# Patient Record
Sex: MALE | Race: WHITE | NOT HISPANIC OR LATINO | URBAN - METROPOLITAN AREA
[De-identification: names, ages, dates, MRNs, and addresses within clinical notes are randomized per-mention and may not be internally consistent; named-entity substitution may affect disease eponyms.]

---

## 2017-10-28 ENCOUNTER — HOSPITAL ENCOUNTER (INPATIENT)
Facility: HOSPITAL | Age: 52
LOS: 2 days | Discharge: HOME | DRG: 603 | End: 2017-10-30
Attending: EMERGENCY MEDICINE | Admitting: EMERGENCY MEDICINE

## 2017-10-28 DIAGNOSIS — L03.113 CELLULITIS OF RIGHT HAND: Primary | ICD-10-CM

## 2017-10-28 PROBLEM — B19.20 HEPATITIS C: Status: ACTIVE | Noted: 2017-10-28

## 2017-10-28 LAB
ALBUMIN SERPL BCP-MCNC: 3.6 G/DL
ALP SERPL-CCNC: 82 U/L
ALT SERPL W/O P-5'-P-CCNC: 52 U/L
ANION GAP SERPL CALC-SCNC: 11 MMOL/L
AST SERPL-CCNC: 55 U/L
BASOPHILS # BLD AUTO: 0.05 K/UL
BASOPHILS NFR BLD: 0.6 %
BILIRUB SERPL-MCNC: 0.8 MG/DL
BUN SERPL-MCNC: 15 MG/DL
CALCIUM SERPL-MCNC: 9.4 MG/DL
CHLORIDE SERPL-SCNC: 99 MMOL/L
CO2 SERPL-SCNC: 26 MMOL/L
CREAT SERPL-MCNC: 0.8 MG/DL
DIFFERENTIAL METHOD: ABNORMAL
EOSINOPHIL # BLD AUTO: 0.1 K/UL
EOSINOPHIL NFR BLD: 1.2 %
ERYTHROCYTE [DISTWIDTH] IN BLOOD BY AUTOMATED COUNT: 12.1 %
EST. GFR  (AFRICAN AMERICAN): >60 ML/MIN/1.73 M^2
EST. GFR  (NON AFRICAN AMERICAN): >60 ML/MIN/1.73 M^2
GLUCOSE SERPL-MCNC: 94 MG/DL
HCT VFR BLD AUTO: 39.5 %
HGB BLD-MCNC: 13.8 G/DL
IMM GRANULOCYTES # BLD AUTO: 0.04 K/UL
IMM GRANULOCYTES NFR BLD AUTO: 0.4 %
LYMPHOCYTES # BLD AUTO: 1.3 K/UL
LYMPHOCYTES NFR BLD: 14.3 %
MCH RBC QN AUTO: 32.1 PG
MCHC RBC AUTO-ENTMCNC: 34.9 G/DL
MCV RBC AUTO: 92 FL
MONOCYTES # BLD AUTO: 0.7 K/UL
MONOCYTES NFR BLD: 8.1 %
NEUTROPHILS # BLD AUTO: 6.8 K/UL
NEUTROPHILS NFR BLD: 75.4 %
NRBC BLD-RTO: 0 /100 WBC
PLATELET # BLD AUTO: 173 K/UL
PMV BLD AUTO: 10.3 FL
POTASSIUM SERPL-SCNC: 3.7 MMOL/L
PROT SERPL-MCNC: 8.6 G/DL
RBC # BLD AUTO: 4.3 M/UL
SODIUM SERPL-SCNC: 136 MMOL/L
WBC # BLD AUTO: 9.02 K/UL

## 2017-10-28 PROCEDURE — 99285 EMERGENCY DEPT VISIT HI MDM: CPT | Mod: 25

## 2017-10-28 PROCEDURE — 99223 1ST HOSP IP/OBS HIGH 75: CPT | Mod: ,,, | Performed by: INTERNAL MEDICINE

## 2017-10-28 PROCEDURE — S4991 NICOTINE PATCH NONLEGEND: HCPCS | Performed by: EMERGENCY MEDICINE

## 2017-10-28 PROCEDURE — 63600175 PHARM REV CODE 636 W HCPCS: Performed by: EMERGENCY MEDICINE

## 2017-10-28 PROCEDURE — 96365 THER/PROPH/DIAG IV INF INIT: CPT

## 2017-10-28 PROCEDURE — 80053 COMPREHEN METABOLIC PANEL: CPT

## 2017-10-28 PROCEDURE — S0077 INJECTION, CLINDAMYCIN PHOSP: HCPCS | Performed by: EMERGENCY MEDICINE

## 2017-10-28 PROCEDURE — S0077 INJECTION, CLINDAMYCIN PHOSP: HCPCS | Performed by: INTERNAL MEDICINE

## 2017-10-28 PROCEDURE — 11000001 HC ACUTE MED/SURG PRIVATE ROOM

## 2017-10-28 PROCEDURE — 25000003 PHARM REV CODE 250: Performed by: INTERNAL MEDICINE

## 2017-10-28 PROCEDURE — 96375 TX/PRO/DX INJ NEW DRUG ADDON: CPT

## 2017-10-28 PROCEDURE — 85025 COMPLETE CBC W/AUTO DIFF WBC: CPT

## 2017-10-28 PROCEDURE — 25000003 PHARM REV CODE 250: Performed by: EMERGENCY MEDICINE

## 2017-10-28 PROCEDURE — 99284 EMERGENCY DEPT VISIT MOD MDM: CPT | Mod: ,,, | Performed by: EMERGENCY MEDICINE

## 2017-10-28 RX ORDER — KETOROLAC TROMETHAMINE 30 MG/ML
15 INJECTION, SOLUTION INTRAMUSCULAR; INTRAVENOUS
Status: COMPLETED | OUTPATIENT
Start: 2017-10-28 | End: 2017-10-28

## 2017-10-28 RX ORDER — IBUPROFEN 200 MG
1 TABLET ORAL DAILY
Status: DISCONTINUED | OUTPATIENT
Start: 2017-10-28 | End: 2017-10-30 | Stop reason: HOSPADM

## 2017-10-28 RX ORDER — OXYCODONE AND ACETAMINOPHEN 5; 325 MG/1; MG/1
1 TABLET ORAL EVERY 4 HOURS PRN
Status: DISCONTINUED | OUTPATIENT
Start: 2017-10-28 | End: 2017-10-30 | Stop reason: HOSPADM

## 2017-10-28 RX ORDER — CLINDAMYCIN PHOSPHATE 600 MG/50ML
600 INJECTION, SOLUTION INTRAVENOUS
Status: COMPLETED | OUTPATIENT
Start: 2017-10-28 | End: 2017-10-28

## 2017-10-28 RX ORDER — CLINDAMYCIN PHOSPHATE 600 MG/50ML
600 INJECTION, SOLUTION INTRAVENOUS
Status: DISCONTINUED | OUTPATIENT
Start: 2017-10-28 | End: 2017-10-30 | Stop reason: HOSPADM

## 2017-10-28 RX ORDER — NICOTINE 7MG/24HR
1 PATCH, TRANSDERMAL 24 HOURS TRANSDERMAL DAILY
Status: DISCONTINUED | OUTPATIENT
Start: 2017-10-28 | End: 2017-10-28

## 2017-10-28 RX ADMIN — OXYCODONE HYDROCHLORIDE AND ACETAMINOPHEN 1 TABLET: 5; 325 TABLET ORAL at 09:10

## 2017-10-28 RX ADMIN — NICOTINE 1 PATCH: 14 PATCH, EXTENDED RELEASE TRANSDERMAL at 03:10

## 2017-10-28 RX ADMIN — CLINDAMYCIN IN 5 PERCENT DEXTROSE 600 MG: 12 INJECTION, SOLUTION INTRAVENOUS at 01:10

## 2017-10-28 RX ADMIN — KETOROLAC TROMETHAMINE 15 MG: 30 INJECTION, SOLUTION INTRAMUSCULAR at 03:10

## 2017-10-28 RX ADMIN — CLINDAMYCIN IN 5 PERCENT DEXTROSE 600 MG: 12 INJECTION, SOLUTION INTRAVENOUS at 09:10

## 2017-10-28 NOTE — ED PROVIDER NOTES
"Encounter Date: 10/28/2017    SCRIBE #1 NOTE: I, Beth Burks, am scribing for, and in the presence of,  Dr. Stewart. I have scribed the entire note.       History     Chief Complaint   Patient presents with    Cellulitis     pt states "I think I got bit by a spider, swelling to right hand.  pt also c/o blisters to toes     Time patient was seen by the provider: 12:35 PM      The patient is a 52 y.o. male with no pertinent medical history that presents to the ED with a complaint of swelling to the right hand and arm. He believes that he was bitten by a spider a few days ago. He tried to tried to treat it at home by attempting to opening it and putting peroxide and "another cream" on it. He then noticed increased swelling and pain in his hand and noticed it starting to move up his arm. The swelling currently extends to his axilla. He took Advil PTA. He also endorses bilateral non-healing blisters on his toes.    He endorses hepatitis C for the last 33 years, and taking medication for a while, but stopping because he could not afford the medication. 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 He denies other substance use, numbness in his fingers, medical problems, DM, and home medications. He has not seen a doctor in the last year.        The history is provided by the patient.     Review of patient's allergies indicates:  No Known Allergies  History reviewed. No pertinent past medical history.  Past Surgical History:   Procedure Laterality Date    INGUINAL HERNIA REPAIR      TONSILLECTOMY       History reviewed. No pertinent family history.  Social History   Substance Use Topics    Smoking status: Current Some Day Smoker    Smokeless tobacco: Never Used    Alcohol use 2.4 oz/week     4 Cans of beer per week     Review of Systems   Constitutional: Negative for fever.   HENT: Negative for sore throat.  "   Eyes: Negative for visual disturbance.   Respiratory: Negative for shortness of breath.    Cardiovascular: Negative for chest pain.   Gastrointestinal: Negative for nausea.   Genitourinary: Negative for dysuria.   Musculoskeletal: Negative for back pain.        Positive for swelling in his right hand and arm, extending to his axilla.  Negative for numbness in his fingers.   Skin: Positive for wound (index finger of his right hand, and non-healing blisters on his toes bilaterally). Negative for rash.   Neurological: Negative for weakness.       Physical Exam     Initial Vitals [10/28/17 1206]   BP Pulse Resp Temp SpO2   128/67 66 20 97.9 °F (36.6 °C) 99 %      MAP       87.33         Physical Exam    Nursing note and vitals reviewed.  Constitutional: He appears well-developed and well-nourished. No distress.   HENT:   Head: Normocephalic and atraumatic.   Mouth/Throat: Oropharynx is clear and moist.   Eyes: Pupils are equal, round, and reactive to light.   Neck: Normal range of motion. Neck supple. No thyromegaly present. No muscular tenderness present. No erythema present. No JVD present.   Cardiovascular: Normal rate and regular rhythm. Exam reveals no gallop and no friction rub.    No murmur heard.  Brisk capillary refill   Pulmonary/Chest: No respiratory distress. He has no wheezes. He has no rhonchi. He has no rales.   Abdominal: Soft. Bowel sounds are normal. He exhibits no distension and no mass. There is no tenderness.   Musculoskeletal:   Extremities are warm, well perfused, no clubbing, or cyanosis.    Neurological:   Alert and oriented x4, answers questions appropriately with fluid speech, and moves all extremities.   Skin: Skin is warm and dry.   Ulcerated area on the ulnar side of the base of the second digit of his right hand, with surrounding edema, erythema, and pain to palpation. Swelling extends onto the first and second knuckle, and onto the dorsum of the hand. Streaking on the forearm extending  to the shoulder. Tender lymph nodes in the elbow and axilla.  Vascular changes to both feet that appear chronic. Some skin break down between toes and onychomycosis.           ED Course   Procedures  Labs Reviewed   CBC W/ AUTO DIFFERENTIAL - Abnormal; Notable for the following:        Result Value    RBC 4.30 (*)     Hemoglobin 13.8 (*)     Hematocrit 39.5 (*)     MCH 32.1 (*)     Gran% 75.4 (*)     Lymph% 14.3 (*)     All other components within normal limits   COMPREHENSIVE METABOLIC PANEL - Abnormal; Notable for the following:     Total Protein 8.6 (*)     AST 55 (*)     ALT 52 (*)     All other components within normal limits             Medical Decision Making:   History:   Old Medical Records: I decided to obtain old medical records.  Initial Assessment:   Patient here with symptoms consistent with right hand cellulitis. Unknown cause but as patient works with spiders, could be related. He has streaking up his arm and tenderness in his elbow and axillary nodes. Concerning for spreading infection. Started on clindamycin in the ED. White count normal. Labs otherwise unremarkable. Will admit for further care given location of cellulitis.  Clinical Tests:   Lab Tests: Ordered and Reviewed            Scribe Attestation:   Scribe #1: I performed the above scribed service and the documentation accurately describes the services I performed. I attest to the accuracy of the note.    I, Dr. Favian Stewart, personally performed the services described in this documentation. All medical record entries made by the scribe were at my direction and in my presence.  I have reviewed the chart and agree that the record reflects my personal performance and is accurate and complete. Favian Stewart MD.  3:41 PM 10/28/2017            ED Course      Clinical Impression:   The encounter diagnosis was Cellulitis of right hand.    Disposition:   Disposition: Discharged  Condition: Stable                        Favian Stewart,  MD  10/28/17 1211

## 2017-10-28 NOTE — ED TRIAGE NOTES
Wednesday or Thursday thinks he was bitten by spider at work.  Now with swelling with right hand,   between index and middle finger wound noted when bandaides removed.  Open blister to toes and bottom of feet. Bilaterally.

## 2017-10-28 NOTE — ED NOTES
LOC: The patient is awake, alert, and oriented to place, time, situation. Affect is appropriate.  Speech is appropriate and clear.     APPEARANCE: Patient resting comfortably in no acute distress.  Patient is clean.    SKIN: The skin is warm and dry; color consistent with ethnicity.  Patient has normal skin turgor and moist mucus membranes.  Breakdown, open blistered to feet/ toes, Right hand with swelling and wound  between index and  middle finger, red streaking noted on upper arm.  MUSCULOSKELETAL:  Denies weakness.     RESPIRATORY: Airway is open and patent. Respirations spontaneous, even, easy, and non-labored.  Patient has a normal effort and rate.  No accessory muscle use noted. Denies cough.     CARDIAC:  No peripheral edema noted. No complaints of chest pain.      ABDOMEN: Soft and non tender to palpation.  No distention noted.     NEUROLOGIC: Eyes open spontaneously.  Behavior appropriate to situation.  Follows commands; facial expression symmetrical.  Purposeful motor response noted.

## 2017-10-28 NOTE — HOSPITAL COURSE
"The patient is a 52 y.o. male with no pertinent medical history that presents to the ED with a complaint of swelling to the right hand and arm. He believes that he was bitten by a spider a few days ago. He tried to tried to treat it at home by attempting to opening it and putting peroxide and "another cream" on it. He then noticed increased swelling and pain in his hand and noticed it starting to move up his arm. The swelling currently extends to his axilla. He took Advil PTA. He also endorses bilateral non-healing blisters on his toes.     He endorses hepatitis C for the last 33 years, and taking medication for a while, but stopping because he could not afford the medication. 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 He denies other substance use, numbness in his fingers, medical problems, DM, and home medications. He has not seen a doctor in the last year.      Will provide po meds on discharge.    Seen by ortho No surgery needed.  "

## 2017-10-28 NOTE — NURSING
Received patient from ER per stretcher. Oriented the patient to room/ unit.   Kept rested and comfortable. Call light placed within easy reach.  Dressing to right hand dressing is clean, dry, and intact.

## 2017-10-28 NOTE — SUBJECTIVE & OBJECTIVE
History reviewed. No pertinent past medical history.    Past Surgical History:   Procedure Laterality Date    INGUINAL HERNIA REPAIR      TONSILLECTOMY         Review of patient's allergies indicates:  No Known Allergies    No current facility-administered medications on file prior to encounter.      No current outpatient prescriptions on file prior to encounter.     Family History     None        Social History Main Topics    Smoking status: Current Some Day Smoker    Smokeless tobacco: Never Used    Alcohol use 2.4 oz/week     4 Cans of beer per week    Drug use:      Types: Marijuana    Sexual activity: Not on file     Review of Systems   Constitutional: Negative.    HENT: Negative.    Respiratory: Negative.    Cardiovascular: Negative.    Endocrine: Negative.    Genitourinary: Negative.      Objective:     Vital Signs (Most Recent):  Temp: 97.9 °F (36.6 °C) (10/28/17 1206)  Pulse: 66 (10/28/17 1206)  Resp: 20 (10/28/17 1206)  BP: 128/67 (10/28/17 1206)  SpO2: 99 % (10/28/17 1206) Vital Signs (24h Range):  Temp:  [97.9 °F (36.6 °C)] 97.9 °F (36.6 °C)  Pulse:  [66] 66  Resp:  [20] 20  SpO2:  [99 %] 99 %  BP: (128)/(67) 128/67     Weight: 66.7 kg (147 lb)  Body mass index is 21.09 kg/m².    Physical Exam   Constitutional: He is oriented to person, place, and time. He appears well-developed.   HENT:   Head: Normocephalic and atraumatic.   Eyes: EOM are normal. Pupils are equal, round, and reactive to light.   Neck: Normal range of motion. Neck supple.   Cardiovascular: Normal rate and regular rhythm.    Pulmonary/Chest: Effort normal and breath sounds normal.   Abdominal: Soft. Bowel sounds are normal.   Neurological: He is alert and oriented to person, place, and time.   Skin: Skin is warm. There is erythema.

## 2017-10-28 NOTE — PLAN OF CARE
Problem: Patient Care Overview  Goal: Plan of Care Review  Outcome: Ongoing (interventions implemented as appropriate)  Patient resting in room. Alert and oriented x 4. RIght hand dressing remains clean, dry and intact. Dr. Jones was informed of open wounds to toes.  Patient afebrile. Kept rested and comfortable.

## 2017-10-28 NOTE — PROGRESS NOTES
"[facility] Hospital Medicine  Progress Note    Patient Name: Red Darden  MRN: [id number]  Patient Class: IP- Inpatient   Admission Date: 10/28/2017  Length of Stay: 0 days  Attending Physician: Favian Stewart MD  Primary Care Provider: No primary care provider on file.    Hospital Medicine Team: [facility] Holden Jones MD    Subjective:     Principal Problem:<principal problem not specified>    HPI:  No notes on file    Hospital Course:  The patient is a 52 y.o. male with no pertinent medical history that presents to the ED with a complaint of swelling to the right hand and arm. He believes that he was bitten by a spider a few days ago. He tried to tried to treat it at home by attempting to opening it and putting peroxide and "another cream" on it. He then noticed increased swelling and pain in his hand and noticed it starting to move up his arm. The swelling currently extends to his axilla. He took Advil PTA. He also endorses bilateral non-healing blisters on his toes.     He endorses hepatitis C for the last 33 years, and taking medication for a while, but stopping because he could not afford the medication. 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 He denies other substance use, numbness in his fingers, medical problems, DM, and home medications. He has not seen a doctor in the last year.       History reviewed. No pertinent past medical history.    Past Surgical History:   Procedure Laterality Date    INGUINAL HERNIA REPAIR      TONSILLECTOMY         Review of patient's allergies indicates:  No Known Allergies    No current facility-administered medications on file prior to encounter.      No current outpatient prescriptions on file prior to encounter.     Family History     None        Social History Main Topics    Smoking status: Current Some Day Smoker    Smokeless " tobacco: Never Used    Alcohol use 2.4 oz/week     4 Cans of beer per week    Drug use:      Types: Marijuana    Sexual activity: Not on file     Review of Systems   Constitutional: Negative.    HENT: Negative.    Respiratory: Negative.    Cardiovascular: Negative.    Endocrine: Negative.    Genitourinary: Negative.      Objective:     Vital Signs (Most Recent):  Temp: 97.9 °F (36.6 °C) (10/28/17 1206)  Pulse: 66 (10/28/17 1206)  Resp: 20 (10/28/17 1206)  BP: 128/67 (10/28/17 1206)  SpO2: 99 % (10/28/17 1206) Vital Signs (24h Range):  Temp:  [97.9 °F (36.6 °C)] 97.9 °F (36.6 °C)  Pulse:  [66] 66  Resp:  [20] 20  SpO2:  [99 %] 99 %  BP: (128)/(67) 128/67     Weight: 66.7 kg (147 lb)  Body mass index is 21.09 kg/m².    Physical Exam   Constitutional: He is oriented to person, place, and time. He appears well-developed.   HENT:   Head: Normocephalic and atraumatic.   Eyes: EOM are normal. Pupils are equal, round, and reactive to light.   Neck: Normal range of motion. Neck supple.   Cardiovascular: Normal rate and regular rhythm.    Pulmonary/Chest: Effort normal and breath sounds normal.   Abdominal: Soft. Bowel sounds are normal.   Neurological: He is alert and oriented to person, place, and time.   Skin: Skin is warm. There is erythema.            Assessment/Plan:      Cellulitis of right hand    IV vancomycin  Consider ortho consult            VTE Risk Mitigation     None              Holden Jones MD  Department of Hospital Medicine   [facility]

## 2017-10-29 PROBLEM — R23.8 BULLAE: Status: ACTIVE | Noted: 2017-10-29

## 2017-10-29 LAB
CRP SERPL-MCNC: 11.3 MG/L
ERYTHROCYTE [SEDIMENTATION RATE] IN BLOOD BY WESTERGREN METHOD: 25 MM/HR
LACTATE SERPL-SCNC: 0.8 MMOL/L
PROCALCITONIN SERPL IA-MCNC: 0.05 NG/ML

## 2017-10-29 PROCEDURE — 86140 C-REACTIVE PROTEIN: CPT

## 2017-10-29 PROCEDURE — 85651 RBC SED RATE NONAUTOMATED: CPT

## 2017-10-29 PROCEDURE — 84145 PROCALCITONIN (PCT): CPT

## 2017-10-29 PROCEDURE — S4991 NICOTINE PATCH NONLEGEND: HCPCS | Performed by: EMERGENCY MEDICINE

## 2017-10-29 PROCEDURE — 11000001 HC ACUTE MED/SURG PRIVATE ROOM

## 2017-10-29 PROCEDURE — S0077 INJECTION, CLINDAMYCIN PHOSP: HCPCS | Performed by: INTERNAL MEDICINE

## 2017-10-29 PROCEDURE — 83605 ASSAY OF LACTIC ACID: CPT

## 2017-10-29 PROCEDURE — 36415 COLL VENOUS BLD VENIPUNCTURE: CPT

## 2017-10-29 PROCEDURE — 25000003 PHARM REV CODE 250: Performed by: INTERNAL MEDICINE

## 2017-10-29 PROCEDURE — 25000003 PHARM REV CODE 250: Performed by: EMERGENCY MEDICINE

## 2017-10-29 PROCEDURE — 99233 SBSQ HOSP IP/OBS HIGH 50: CPT | Mod: ,,, | Performed by: INTERNAL MEDICINE

## 2017-10-29 RX ORDER — SILVER SULFADIAZINE 10 G/1000G
CREAM TOPICAL DAILY
Status: DISCONTINUED | OUTPATIENT
Start: 2017-10-30 | End: 2017-10-30 | Stop reason: HOSPADM

## 2017-10-29 RX ADMIN — CLINDAMYCIN IN 5 PERCENT DEXTROSE 600 MG: 12 INJECTION, SOLUTION INTRAVENOUS at 09:10

## 2017-10-29 RX ADMIN — NICOTINE 1 PATCH: 14 PATCH, EXTENDED RELEASE TRANSDERMAL at 08:10

## 2017-10-29 RX ADMIN — OXYCODONE HYDROCHLORIDE AND ACETAMINOPHEN 1 TABLET: 5; 325 TABLET ORAL at 10:10

## 2017-10-29 RX ADMIN — OXYCODONE HYDROCHLORIDE AND ACETAMINOPHEN 1 TABLET: 5; 325 TABLET ORAL at 05:10

## 2017-10-29 RX ADMIN — CLINDAMYCIN IN 5 PERCENT DEXTROSE 600 MG: 12 INJECTION, SOLUTION INTRAVENOUS at 01:10

## 2017-10-29 RX ADMIN — CLINDAMYCIN IN 5 PERCENT DEXTROSE 600 MG: 12 INJECTION, SOLUTION INTRAVENOUS at 05:10

## 2017-10-29 RX ADMIN — OXYCODONE HYDROCHLORIDE AND ACETAMINOPHEN 1 TABLET: 5; 325 TABLET ORAL at 03:10

## 2017-10-29 RX ADMIN — OXYCODONE HYDROCHLORIDE AND ACETAMINOPHEN 1 TABLET: 5; 325 TABLET ORAL at 01:10

## 2017-10-29 RX ADMIN — OXYCODONE HYDROCHLORIDE AND ACETAMINOPHEN 1 TABLET: 5; 325 TABLET ORAL at 08:10

## 2017-10-29 NOTE — PLAN OF CARE
Problem: Patient Care Overview  Goal: Plan of Care Review  Outcome: Ongoing (interventions implemented as appropriate)  Patient AAOx3. Vital signs stable. No falls/injuries as pt calls for assistance. IV antibiotics given as scheduled. Pain being monitored and controlled with PRN medications. Bed in lowest position, wheels locked, call light in reach. Will follow plan of care.

## 2017-10-29 NOTE — SUBJECTIVE & OBJECTIVE
Scheduled Meds:   clindamycin (CLEOCIN) IVPB  600 mg Intravenous Q8H    nicotine  1 patch Transdermal Daily     Continuous Infusions:   PRN Meds:oxyCODONE-acetaminophen    Review of patient's allergies indicates:  No Known Allergies     Past Medical History:   Diagnosis Date    Hepatitis C      Past Surgical History:   Procedure Laterality Date    INGUINAL HERNIA REPAIR      TONSILLECTOMY         Family History     None        Social History Main Topics    Smoking status: Current Some Day Smoker     Packs/day: 1.00     Years: 40.00     Types: Cigarettes    Smokeless tobacco: Never Used    Alcohol use 2.4 oz/week     4 Cans of beer per week    Drug use:      Types: Marijuana    Sexual activity: Not on file     Review of Systems   Constitutional: Negative for chills and fever.   Gastrointestinal: Negative for nausea and vomiting.   Skin:        blisters     Objective:     Vital Signs (Most Recent):  Temp: 97 °F (36.1 °C) (10/29/17 1504)  Pulse: 60 (10/29/17 1504)  Resp: 18 (10/29/17 1504)  BP: 133/77 (10/29/17 1504)  SpO2: 100 % (10/29/17 1504) Vital Signs (24h Range):  Temp:  [97 °F (36.1 °C)-98.8 °F (37.1 °C)] 97 °F (36.1 °C)  Pulse:  [51-76] 60  Resp:  [15-18] 18  SpO2:  [95 %-100 %] 100 %  BP: (110-160)/(66-88) 133/77     Weight: 62.6 kg (138 lb)  Body mass index is 19.8 kg/m².    Foot Exam    General  General Appearance: appears stated age and healthy   Orientation: alert and oriented to person, place, and time   Affect: appropriate       Right Foot/Ankle     Inspection and Palpation  Ecchymosis: none  Tenderness: none   Swelling: none   Arch: normal  Hammertoes: absent  Claw Toes: absent  Hallux valgus: no  Hallux limitus: no    Neurovascular  Dorsalis pedis: 2+  Posterior tibial: 2+  Saphenous nerve sensation: normal  Tibial nerve sensation: normal  Superficial peroneal nerve sensation: normal  Deep peroneal nerve sensation: normal  Sural nerve sensation: normal      Left Foot/Ankle      Inspection  and Palpation  Ecchymosis: none  Tenderness: none   Swelling: none   Arch: normal  Hammertoes: absent  Claw toes: absent  Hallux valgus: no  Hallux limitus: no    Neurovascular  Dorsalis pedis: 2+  Posterior tibial: 2+  Saphenous nerve sensation: normal  Tibial nerve sensation: normal  Superficial peroneal nerve sensation: normal  Deep peroneal nerve sensation: normal  Sural nerve sensation: normal        Blister on interspaces of right 3rd and 4th toe, and left 1st, 2nd, and 3rd digits.  Blisters deroofed, they do not probe, mild surrounding erythema, no purulent discharge.              Laboratory:  CBC:   Recent Labs  Lab 10/28/17  1302   WBC 9.02   RBC 4.30*   HGB 13.8*   HCT 39.5*      MCV 92   MCH 32.1*   MCHC 34.9     CMP:   Recent Labs  Lab 10/28/17  1302   GLU 94   CALCIUM 9.4   ALBUMIN 3.6   PROT 8.6*      K 3.7   CO2 26   CL 99   BUN 15   CREATININE 0.8   ALKPHOS 82   ALT 52*   AST 55*   BILITOT 0.8     CRP:   Recent Labs  Lab 10/29/17  1113   CRP 11.3*     ESR:   Recent Labs  Lab 10/29/17  1113   SEDRATE 25*       Diagnostic Results:  I have reviewed all pertinent imaging results/findings within the past 24 hours.    Clinical Findings:  Blister on interspaces of right 3rd and 4th toe, and left 1st, 2nd, and 3rd digits.  Blisters deroofed, they do not probe, mild surrounding erythema, no purulent discharge.

## 2017-10-29 NOTE — ASSESSMENT & PLAN NOTE
Red Darden is a 52 y.o. male with Blister on interspaces of right 3rd and 4th toe, and left 1st, 2nd, and 3rd digits.  Blisters deroofed, they do not probe, mild surrounding erythema, no purulent discharge.  -Dressed with betadine, 4x4's, kerlix and ace  -Nursing to do daily dressing changes with Silvadine, 4x4's, kerlix, and ace.  Place Darco shoes  -Podiatry will follow    Upon DC patient is to follow up in Podiatry clinic within one week.  Weight bearing as tolerated in Darco shoes.

## 2017-10-29 NOTE — HPI
"Red Darden is a 52 y.o. male who  has a past medical history of Hepatitis C.    Patient Admited for right hand swelling.  Consulted to Podiatry for blisters on toes.  Patient complains of blisters.  States he started a new job 3 weeks ago that required him to be on his feet a lot, and he wore shoes that were too small, and he got multiple blisters on his toes b/l.  He cared for them at home with epsin salt soaks, he put a "cream" on them.  Complains of pain, denies F/C/N/V.      "

## 2017-10-29 NOTE — SUBJECTIVE & OBJECTIVE
Past Medical History:   Diagnosis Date    Hepatitis C        Past Surgical History:   Procedure Laterality Date    INGUINAL HERNIA REPAIR      TONSILLECTOMY         Review of patient's allergies indicates:  No Known Allergies    No current facility-administered medications on file prior to encounter.      No current outpatient prescriptions on file prior to encounter.     Family History     None        Social History Main Topics    Smoking status: Current Some Day Smoker     Packs/day: 1.00     Years: 40.00     Types: Cigarettes    Smokeless tobacco: Never Used    Alcohol use 2.4 oz/week     4 Cans of beer per week    Drug use:      Types: Marijuana    Sexual activity: Not on file     Review of Systems   Constitutional: Negative.    HENT: Negative.    Respiratory: Negative.    Cardiovascular: Negative.    Endocrine: Negative.    Genitourinary: Negative.      Objective:     Vital Signs (Most Recent):  Temp: 97.2 °F (36.2 °C) (10/29/17 0737)  Pulse: (!) 51 (10/29/17 0737)  Resp: 16 (10/29/17 0737)  BP: 124/82 (10/29/17 0737)  SpO2: 98 % (10/29/17 0737) Vital Signs (24h Range):  Temp:  [97.2 °F (36.2 °C)-98.8 °F (37.1 °C)] 97.2 °F (36.2 °C)  Pulse:  [51-76] 51  Resp:  [15-20] 16  SpO2:  [95 %-99 %] 98 %  BP: (110-160)/(66-88) 124/82     Weight: 62.6 kg (138 lb)  Body mass index is 19.8 kg/m².    Physical Exam   Constitutional: He is oriented to person, place, and time. He appears well-developed.   HENT:   Head: Normocephalic and atraumatic.   Eyes: EOM are normal. Pupils are equal, round, and reactive to light.   Neck: Normal range of motion. Neck supple.   Cardiovascular: Normal rate and regular rhythm.    Pulmonary/Chest: Effort normal and breath sounds normal.   Abdominal: Soft. Bowel sounds are normal.   Neurological: He is alert and oriented to person, place, and time.   Skin: Skin is warm. There is erythema.

## 2017-10-29 NOTE — PLAN OF CARE
Problem: Patient Care Overview  Goal: Plan of Care Review  Outcome: Ongoing (interventions implemented as appropriate)  Patient resting in bed. Medicated with percocet x 2 this shift for bilateral foot pain. Dressing applied by podiatrist to bilateral foot. Darco boots applied.   Right hand wound cleansed with NS and non- stick dressing applied and wrapped with Kerlix.  Patient tolerating IV antibiotic. Remained afebrile.

## 2017-10-29 NOTE — PROGRESS NOTES
"[facility] Hospital Medicine  Progress Note    Patient Name: Red Darden  MRN: [id number]  Patient Class: IP- Inpatient   Admission Date: 10/28/2017  Length of Stay: 1 days  Attending Physician: Holden Jones MD  Primary Care Provider: No primary care provider on file.    Hospital Medicine Team: [facility] MED B Holden Jones MD    Subjective:     Principal Problem:<principal problem not specified>    HPI:  No notes on file    Hospital Course:  The patient is a 52 y.o. male with no pertinent medical history that presents to the ED with a complaint of swelling to the right hand and arm. He believes that he was bitten by a spider a few days ago. He tried to tried to treat it at home by attempting to opening it and putting peroxide and "another cream" on it. He then noticed increased swelling and pain in his hand and noticed it starting to move up his arm. The swelling currently extends to his axilla. He took Advil PTA. He also endorses bilateral non-healing blisters on his toes.     He endorses hepatitis C for the last 33 years, and taking medication for a while, but stopping because he could not afford the medication. 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 He denies other substance use, numbness in his fingers, medical problems, DM, and home medications. He has not seen a doctor in the last year.       Past Medical History:   Diagnosis Date    Hepatitis C        Past Surgical History:   Procedure Laterality Date    INGUINAL HERNIA REPAIR      TONSILLECTOMY         Review of patient's allergies indicates:  No Known Allergies    No current facility-administered medications on file prior to encounter.      No current outpatient prescriptions on file prior to encounter.     Family History     None        Social History Main Topics    Smoking status: Current Some Day Smoker     " Packs/day: 1.00     Years: 40.00     Types: Cigarettes    Smokeless tobacco: Never Used    Alcohol use 2.4 oz/week     4 Cans of beer per week    Drug use:      Types: Marijuana    Sexual activity: Not on file     Review of Systems   Constitutional: Negative.    HENT: Negative.    Respiratory: Negative.    Cardiovascular: Negative.    Endocrine: Negative.    Genitourinary: Negative.      Objective:     Vital Signs (Most Recent):  Temp: 97.2 °F (36.2 °C) (10/29/17 0737)  Pulse: (!) 51 (10/29/17 0737)  Resp: 16 (10/29/17 0737)  BP: 124/82 (10/29/17 0737)  SpO2: 98 % (10/29/17 0737) Vital Signs (24h Range):  Temp:  [97.2 °F (36.2 °C)-98.8 °F (37.1 °C)] 97.2 °F (36.2 °C)  Pulse:  [51-76] 51  Resp:  [15-20] 16  SpO2:  [95 %-99 %] 98 %  BP: (110-160)/(66-88) 124/82     Weight: 62.6 kg (138 lb)  Body mass index is 19.8 kg/m².    Physical Exam   Constitutional: He is oriented to person, place, and time. He appears well-developed.   HENT:   Head: Normocephalic and atraumatic.   Eyes: EOM are normal. Pupils are equal, round, and reactive to light.   Neck: Normal range of motion. Neck supple.   Cardiovascular: Normal rate and regular rhythm.    Pulmonary/Chest: Effort normal and breath sounds normal.   Abdominal: Soft. Bowel sounds are normal.   Neurological: He is alert and oriented to person, place, and time.   Skin: Skin is warm. There is erythema.            Assessment/Plan:      Hepatitis C              Cellulitis of right hand    IV vancomycin  Consider ortho consult            VTE Risk Mitigation         Ordered     Medium Risk of VTE  Once      10/28/17 1644     Place LOLY hose  Until discontinued      10/28/17 1644              Holden Jones MD  Department of [facility] Medicine   [facility]

## 2017-10-29 NOTE — H&P
"[facility] Hospital Medicine  History and Physical     Patient Name: Red Darden  MRN: [id number]  Patient Class: IP- Inpatient     Admission Date: 10/28/2017  Length of Stay: 0 days  Attending Physician: Favian Stewart MD  Primary Care Provider: No primary care provider on file.     Hospital Medicine Team: [facility] [place] [facility] MED B Holden Jones MD     Subjective:      Principal Problem:<principal problem not specified>     HPI:  No notes on file     Hospital Course:  The patient is a 52 y.o. male with no pertinent medical history that presents to the ED with a complaint of swelling to the right hand and arm. He believes that he was bitten by a spider a few days ago. He tried to tried to treat it at home by attempting to opening it and putting peroxide and "another cream" on it. He then noticed increased swelling and pain in his hand and noticed it starting to move up his arm. The swelling currently extends to his axilla. He took Advil PTA. He also endorses bilateral non-healing blisters on his toes.     He endorses hepatitis C for the last 33 years, and taking medication for a while, but stopping because he could not afford the medication. 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 He denies other substance use, numbness in his fingers, medical problems, DM, and home medications. He has not seen a doctor in the last year.        History reviewed. No pertinent past medical history.           Past Surgical History:   Procedure Laterality Date    INGUINAL HERNIA REPAIR        TONSILLECTOMY             Review of patient's allergies indicates:  No Known Allergies     No current facility-administered medications on file prior to encounter.       No current outpatient prescriptions on file prior to encounter.          Family History      None                Social History Main Topics    Smoking " status: Current Some Day Smoker    Smokeless tobacco: Never Used    Alcohol use 2.4 oz/week       4 Cans of beer per week    Drug use:         Types: Marijuana    Sexual activity: Not on file      Review of Systems   Constitutional: Negative.    HENT: Negative.    Respiratory: Negative.    Cardiovascular: Negative.    Endocrine: Negative.    Genitourinary: Negative.       Objective:      Vital Signs (Most Recent):  Temp: 97.9 °F (36.6 °C) (10/28/17 1206)  Pulse: 66 (10/28/17 1206)  Resp: 20 (10/28/17 1206)  BP: 128/67 (10/28/17 1206)  SpO2: 99 % (10/28/17 1206) Vital Signs (24h Range):  Temp:  [97.9 °F (36.6 °C)] 97.9 °F (36.6 °C)  Pulse:  [66] 66  Resp:  [20] 20  SpO2:  [99 %] 99 %  BP: (128)/(67) 128/67      Weight: 66.7 kg (147 lb)  Body mass index is 21.09 kg/m².     Physical Exam   Constitutional: He is oriented to person, place, and time. He appears well-developed.   HENT:   Head: Normocephalic and atraumatic.   Eyes: EOM are normal. Pupils are equal, round, and reactive to light.   Neck: Normal range of motion. Neck supple.   Cardiovascular: Normal rate and regular rhythm.    Pulmonary/Chest: Effort normal and breath sounds normal.   Abdominal: Soft. Bowel sounds are normal.   Neurological: He is alert and oriented to person, place, and time.   Skin: Skin is warm. There is erythema.               Assessment/Plan:          Cellulitis of right hand     IV vancomycin  Consider ortho consult                 VTE Risk Mitigation      None                   Holden Joens MD  Department of Hospital Medicine   [facility]

## 2017-10-29 NOTE — CONSULTS
"[facility]  Podiatry  Consult Note    Patient Name: Red Darden  MRN: [id number]  Admission Date: 10/28/2017  Hospital Length of Stay: 1 days  Attending Physician: Javy Jones MD  Primary Care Provider: No primary care provider on file.     Inpatient consult to Podiatry  Consult performed by: MARIANNA HUSAIN  Consult ordered by: JAVY JONES        Subjective:     History of Present Illness:  Red Darden is a 52 y.o. male who  has a past medical history of Hepatitis C.    Patient Admited for right hand swelling.  Consulted to Podiatry for blisters on toes.  Patient complains of blisters.  States he started a new job 3 weeks ago that required him to be on his feet a lot, and he wore shoes that were too small, and he got multiple blisters on his toes b/l.  He cared for them at home with epsin salt soaks, he put a "cream" on them.  Complains of pain, denies F/C/N/V.        Scheduled Meds:   clindamycin (CLEOCIN) IVPB  600 mg Intravenous Q8H    nicotine  1 patch Transdermal Daily     Continuous Infusions:   PRN Meds:oxyCODONE-acetaminophen    Review of patient's allergies indicates:  No Known Allergies     Past Medical History:   Diagnosis Date    Hepatitis C      Past Surgical History:   Procedure Laterality Date    INGUINAL HERNIA REPAIR      TONSILLECTOMY         Family History     None        Social History Main Topics    Smoking status: Current Some Day Smoker     Packs/day: 1.00     Years: 40.00     Types: Cigarettes    Smokeless tobacco: Never Used    Alcohol use 2.4 oz/week     4 Cans of beer per week    Drug use:      Types: Marijuana    Sexual activity: Not on file     Review of Systems   Constitutional: Negative for chills and fever.   Gastrointestinal: Negative for nausea and vomiting.   Skin:        blisters     Objective:     Vital Signs (Most Recent):  Temp: 97 °F (36.1 °C) (10/29/17 1504)  Pulse: 60 (10/29/17 1504)  Resp: 18 (10/29/17 1504)  BP: 133/77 (10/29/17 " 1504)  SpO2: 100 % (10/29/17 1504) Vital Signs (24h Range):  Temp:  [97 °F (36.1 °C)-98.8 °F (37.1 °C)] 97 °F (36.1 °C)  Pulse:  [51-76] 60  Resp:  [15-18] 18  SpO2:  [95 %-100 %] 100 %  BP: (110-160)/(66-88) 133/77     Weight: 62.6 kg (138 lb)  Body mass index is 19.8 kg/m².    Foot Exam    General  General Appearance: appears stated age and healthy   Orientation: alert and oriented to person, place, and time   Affect: appropriate       Right Foot/Ankle     Inspection and Palpation  Ecchymosis: none  Tenderness: none   Swelling: none   Arch: normal  Hammertoes: absent  Claw Toes: absent  Hallux valgus: no  Hallux limitus: no    Neurovascular  Dorsalis pedis: 2+  Posterior tibial: 2+  Saphenous nerve sensation: normal  Tibial nerve sensation: normal  Superficial peroneal nerve sensation: normal  Deep peroneal nerve sensation: normal  Sural nerve sensation: normal      Left Foot/Ankle      Inspection and Palpation  Ecchymosis: none  Tenderness: none   Swelling: none   Arch: normal  Hammertoes: absent  Claw toes: absent  Hallux valgus: no  Hallux limitus: no    Neurovascular  Dorsalis pedis: 2+  Posterior tibial: 2+  Saphenous nerve sensation: normal  Tibial nerve sensation: normal  Superficial peroneal nerve sensation: normal  Deep peroneal nerve sensation: normal  Sural nerve sensation: normal        Blister on interspaces of right 3rd and 4th toe, and left 1st, 2nd, and 3rd digits.  Blisters deroofed, they do not probe, mild surrounding erythema, no purulent discharge.              Laboratory:  CBC:   Recent Labs  Lab 10/28/17  1302   WBC 9.02   RBC 4.30*   HGB 13.8*   HCT 39.5*      MCV 92   MCH 32.1*   MCHC 34.9     CMP:   Recent Labs  Lab 10/28/17  1302   GLU 94   CALCIUM 9.4   ALBUMIN 3.6   PROT 8.6*      K 3.7   CO2 26   CL 99   BUN 15   CREATININE 0.8   ALKPHOS 82   ALT 52*   AST 55*   BILITOT 0.8     CRP:   Recent Labs  Lab 10/29/17  1113   CRP 11.3*     ESR:   Recent Labs  Lab 10/29/17  1113    SEDRATE 25*       Diagnostic Results:  I have reviewed all pertinent imaging results/findings within the past 24 hours.    Clinical Findings:  Blister on interspaces of right 3rd and 4th toe, and left 1st, 2nd, and 3rd digits.  Blisters deroofed, they do not probe, mild surrounding erythema, no purulent discharge.    Assessment/Plan:     Marie Darden is a 52 y.o. male with Blister on interspaces of right 3rd and 4th toe, and left 1st, 2nd, and 3rd digits.  Blisters deroofed, they do not probe, mild surrounding erythema, no purulent discharge.  -Dressed with betadine, 4x4's, kerlix and ace  -Nursing to do daily dressing changes with Silvadine, 4x4's, kerlix, and ace.  Place Darco shoes  -Podiatry will follow    Upon DC patient is to follow up in Podiatry clinic within one week.  Weight bearing as tolerated in Darco shoes.        Hepatitis C    Per primary        Cellulitis of right hand    Per primary            Thank you for your consult. I will follow-up with patient. Please contact us if you have any additional questions.    Mateo Grewal MD  Podiatry  [facility]

## 2017-10-30 VITALS
HEIGHT: 70 IN | WEIGHT: 138 LBS | RESPIRATION RATE: 18 BRPM | HEART RATE: 60 BPM | DIASTOLIC BLOOD PRESSURE: 92 MMHG | SYSTOLIC BLOOD PRESSURE: 166 MMHG | OXYGEN SATURATION: 97 % | BODY MASS INDEX: 19.76 KG/M2 | TEMPERATURE: 98 F

## 2017-10-30 PROCEDURE — 99232 SBSQ HOSP IP/OBS MODERATE 35: CPT | Mod: ,,, | Performed by: PODIATRIST

## 2017-10-30 PROCEDURE — S0077 INJECTION, CLINDAMYCIN PHOSP: HCPCS | Performed by: INTERNAL MEDICINE

## 2017-10-30 PROCEDURE — S4991 NICOTINE PATCH NONLEGEND: HCPCS | Performed by: EMERGENCY MEDICINE

## 2017-10-30 PROCEDURE — 99238 HOSP IP/OBS DSCHRG MGMT 30/<: CPT | Mod: ,,, | Performed by: INTERNAL MEDICINE

## 2017-10-30 PROCEDURE — 25000003 PHARM REV CODE 250: Performed by: INTERNAL MEDICINE

## 2017-10-30 PROCEDURE — 25000003 PHARM REV CODE 250: Performed by: EMERGENCY MEDICINE

## 2017-10-30 RX ORDER — OXYCODONE AND ACETAMINOPHEN 5; 325 MG/1; MG/1
1 TABLET ORAL EVERY 4 HOURS PRN
Qty: 20 TABLET | Refills: 0 | Status: SHIPPED | OUTPATIENT
Start: 2017-10-30

## 2017-10-30 RX ORDER — CLINDAMYCIN HYDROCHLORIDE 300 MG/1
300 CAPSULE ORAL EVERY 8 HOURS
Qty: 21 CAPSULE | Refills: 0 | Status: SHIPPED | OUTPATIENT
Start: 2017-10-30 | End: 2017-11-06

## 2017-10-30 RX ADMIN — CLINDAMYCIN IN 5 PERCENT DEXTROSE 600 MG: 12 INJECTION, SOLUTION INTRAVENOUS at 05:10

## 2017-10-30 RX ADMIN — NICOTINE 1 PATCH: 14 PATCH, EXTENDED RELEASE TRANSDERMAL at 09:10

## 2017-10-30 RX ADMIN — OXYCODONE HYDROCHLORIDE AND ACETAMINOPHEN 1 TABLET: 5; 325 TABLET ORAL at 10:10

## 2017-10-30 RX ADMIN — OXYCODONE HYDROCHLORIDE AND ACETAMINOPHEN 1 TABLET: 5; 325 TABLET ORAL at 05:10

## 2017-10-30 RX ADMIN — Medication 40 G: at 01:10

## 2017-10-30 NOTE — ASSESSMENT & PLAN NOTE
52 y.o. male with R hand webspace wound    Pain control  DVT PPx: per primary  Abx: IV clindamycin  Drain: none  Rowe: none    No concern for flexor tenosynovitis.  No fluctuance suggesting drainable abscess.  Recommend continued IV abx and wound care for macerated wound.  No surgery indicated at this time.

## 2017-10-30 NOTE — SUBJECTIVE & OBJECTIVE
"Past Medical History:   Diagnosis Date    Hepatitis C        Past Surgical History:   Procedure Laterality Date    INGUINAL HERNIA REPAIR      TONSILLECTOMY         Review of patient's allergies indicates:  No Known Allergies    Current Facility-Administered Medications   Medication    clindamycin 600 MG/50 ML D5W 600 mg/50 mL IVPB 600 mg    nicotine 14 mg/24 hr 1 patch    oxyCODONE-acetaminophen 5-325 mg per tablet 1 tablet    [START ON 10/30/2017] silver sulfADIAZINE 1% cream     Family History     None        Social History Main Topics    Smoking status: Current Some Day Smoker     Packs/day: 1.00     Years: 40.00     Types: Cigarettes    Smokeless tobacco: Never Used    Alcohol use 2.4 oz/week     4 Cans of beer per week    Drug use:      Types: Marijuana    Sexual activity: Not on file     ROS   Constitutional: negative for fevers  Eyes: no visual changes  ENT: negative for hearing loss  Respiratory: negative for dyspnea  Cardiovascular: negative for chest pain  Gastrointestinal: negative for abdominal pain  Genitourinary: negative for dysuria  Neurological: negative for headaches  Behavioral/Psych: negative for hallucinations  Endocrine: negative for temperature intolerance    Objective:     Vital Signs (Most Recent):  Temp: 97 °F (36.1 °C) (10/29/17 1504)  Pulse: 60 (10/29/17 1504)  Resp: 18 (10/29/17 1504)  BP: 133/77 (10/29/17 1504)  SpO2: 100 % (10/29/17 1504) Vital Signs (24h Range):  Temp:  [97 °F (36.1 °C)-98.8 °F (37.1 °C)] 97 °F (36.1 °C)  Pulse:  [51-76] 60  Resp:  [15-18] 18  SpO2:  [95 %-100 %] 100 %  BP: (110-133)/(66-82) 133/77     Weight: 62.6 kg (138 lb)  Height: 5' 10" (177.8 cm)  Body mass index is 19.8 kg/m².      Intake/Output Summary (Last 24 hours) at 10/29/17 1903  Last data filed at 10/29/17 1831   Gross per 24 hour   Intake             1730 ml   Output                0 ml   Net             1730 ml       Ortho/SPM Exam  MSK:  RUE:   Wound to second webbed space with " fibrinous exudate surrounded by macerated skin, erythema, swelling  No fluctuance  No proximal tracking noted  No volar TTP  No pain with finger extension  No fusiform swelling  Able to move fingers without significant pain  Brisk cap refill  Warm well perfused extremities    Significant Labs:   CBC:   Recent Labs  Lab 10/28/17  1302   WBC 9.02   HGB 13.8*   HCT 39.5*        CRP:   Recent Labs  Lab 10/29/17  1113   CRP 11.3*     Lactic Acid:   Recent Labs  Lab 10/29/17  1113   LACTATE 0.8       Significant Imaging: I have reviewed all pertinent imaging results/findings.

## 2017-10-30 NOTE — PLAN OF CARE
10/30/2017      Red Darden  [address] [address]          [facility] Medicine Dept.  [facility] [address] [id number] [phone] after hours  [phone] fax Red Darden has been hospitalized at the [facility] since 10/28/2017.  Please excuse the patient from duties.  Patient may return on 10/31/2017.  No restrictions.     Please contact me if you have any questions.                  __________________________  Holden Jones MD  10/30/2017

## 2017-10-30 NOTE — PROGRESS NOTES
"[facility]  Orthopedics  Progress Note    Patient Name: Red Darden  MRN: [id number]  Admission Date: 10/28/2017  Hospital Length of Stay: 2 days  Attending Provider: Holden Jones MD  Primary Care Provider: No primary care provider on file.    Subjective:     Principal Problem:<principal problem not specified>    Principal Orthopedic Problem: hand wound    Interval History: Patient seen and examined at bedside.  No acute events overnight.  Pain, redness, swelling improving.    Review of patient's allergies indicates:  No Known Allergies    Current Facility-Administered Medications   Medication    clindamycin 600 MG/50 ML D5W 600 mg/50 mL IVPB 600 mg    nicotine 14 mg/24 hr 1 patch    oxyCODONE-acetaminophen 5-325 mg per tablet 1 tablet    silver sulfADIAZINE 1% cream     Objective:     Vital Signs (Most Recent):  Temp: 96.6 °F (35.9 °C) (10/30/17 0433)  Pulse: (!) 50 (10/30/17 0433)  Resp: 16 (10/30/17 0433)  BP: (!) 141/84 (10/30/17 0433)  SpO2: 96 % (10/30/17 0433) Vital Signs (24h Range):  Temp:  [96.6 °F (35.9 °C)-97.6 °F (36.4 °C)] 96.6 °F (35.9 °C)  Pulse:  [50-60] 50  Resp:  [14-18] 16  SpO2:  [96 %-100 %] 96 %  BP: (112-141)/(69-84) 141/84     Weight: 62.6 kg (138 lb)  Height: 5' 10" (177.8 cm)  Body mass index is 19.8 kg/m².      Intake/Output Summary (Last 24 hours) at 10/30/17 0558  Last data filed at 10/29/17 2245   Gross per 24 hour   Intake             1400 ml   Output                0 ml   Net             1400 ml       Ortho/SPM Exam  AAOx4  NAD  RRR  No increased WOB  No proximal redness  No kanavel signs  No fluctuance  Maceration still significant but improving  NVI  WWP extremities  FCDs in place and functioning    Significant Labs: All pertinent labs within the past 24 hours have been reviewed.    Significant Imaging: I have reviewed all pertinent imaging results/findings.    Assessment/Plan:     Cellulitis of right hand    52 y.o. male with R hand webspace " wound    Pain control  DVT PPx: per primary  Abx: IV clindamycin  Drain: none  Rowe: none    No concern for flexor tenosynovitis.  No fluctuance suggesting drainable abscess.  Recommend continued IV abx and wound care for macerated wound.  No surgery indicated at this time.                Leo Solo MD  Orthopedics  [facility]

## 2017-10-30 NOTE — SUBJECTIVE & OBJECTIVE
"Principal Problem:<principal problem not specified>    Principal Orthopedic Problem: hand wound    Interval History: Patient seen and examined at bedside.  No acute events overnight.  Pain, redness, swelling improving.    Review of patient's allergies indicates:  No Known Allergies    Current Facility-Administered Medications   Medication    clindamycin 600 MG/50 ML D5W 600 mg/50 mL IVPB 600 mg    nicotine 14 mg/24 hr 1 patch    oxyCODONE-acetaminophen 5-325 mg per tablet 1 tablet    silver sulfADIAZINE 1% cream     Objective:     Vital Signs (Most Recent):  Temp: 96.6 °F (35.9 °C) (10/30/17 0433)  Pulse: (!) 50 (10/30/17 0433)  Resp: 16 (10/30/17 0433)  BP: (!) 141/84 (10/30/17 0433)  SpO2: 96 % (10/30/17 0433) Vital Signs (24h Range):  Temp:  [96.6 °F (35.9 °C)-97.6 °F (36.4 °C)] 96.6 °F (35.9 °C)  Pulse:  [50-60] 50  Resp:  [14-18] 16  SpO2:  [96 %-100 %] 96 %  BP: (112-141)/(69-84) 141/84     Weight: 62.6 kg (138 lb)  Height: 5' 10" (177.8 cm)  Body mass index is 19.8 kg/m².      Intake/Output Summary (Last 24 hours) at 10/30/17 0558  Last data filed at 10/29/17 2245   Gross per 24 hour   Intake             1400 ml   Output                0 ml   Net             1400 ml       Ortho/SPM Exam  AAOx4  NAD  RRR  No increased WOB  No proximal redness  No kanavel signs  No fluctuance  Maceration still significant but improving  NVI  WWP extremities  FCDs in place and functioning    Significant Labs: All pertinent labs within the past 24 hours have been reviewed.    Significant Imaging: I have reviewed all pertinent imaging results/findings.  "

## 2017-10-30 NOTE — PLAN OF CARE
10/30/17 1101   Discharge Assessment   Assessment Type Discharge Planning Assessment   Confirmed/corrected address and phone number on facesheet? Yes   Assessment information obtained from? Patient   Expected Length of Stay (days) 3   Communicated expected length of stay with patient/caregiver yes   Prior to hospitilization cognitive status: Alert/Oriented   Prior to hospitalization functional status: Independent   Current cognitive status: Alert/Oriented   Current Functional Status: Independent   Lives With friend(s)   Able to Return to Prior Arrangements yes   Is patient able to care for self after discharge? Yes   Patient's perception of discharge disposition home or selfcare   Readmission Within The Last 30 Days no previous admission in last 30 days   Patient currently being followed by outpatient case management? No   Equipment Currently Used at Home none   Do you have any problems affording any of your prescribed medications? Yes   If yes, what medications? has no funds at present   Does the patient have transportation home? Yes   Transportation Available family or friend will provide   Discharge Plan A Home   Discharge Plan B Home   Patient/Family In Agreement With Plan yes   Pt does not have medical insurance and states that he has no money to pay for medication. Pharmacy assistance referral placed in epic. Cost with discount card is 26.69 for clindamycin. Cost transfer form faxed to pharmacy. Informed patient that he will have to cover cost of pain medication. Work note provided.He states that he does have friend who will provide transportation home.

## 2017-10-30 NOTE — NURSING
Pt discharged in stable condition, discharge instructions and prescriptions given, pt verbalized understanding.  pt waiting on transportation. Mitali Ahn RN

## 2017-10-30 NOTE — CONSULTS
"[facility]  Orthopedics  Consult Note    Patient Name: Red Darden  MRN: [id number]  Admission Date: 10/28/2017  Hospital Length of Stay: 1 days  Attending Provider: Javy Jones MD  Primary Care Provider: No primary care provider on file.    Patient information was obtained from patient, past medical records and ER records.     Inpatient consult to Orthopedic Surgery  Consult performed by: OBI HERNANDEZ  Consult ordered by: JAVY JONES        Subjective:     Principal Problem:<principal problem not specified>    Chief Complaint:   Chief Complaint   Patient presents with    Cellulitis     pt states "I think I got bit by a spider, swelling to right hand.  pt also c/o blisters to toes        HPI: The patient is a 52 y.o. male with h/o Hep C that presents to the ED with a complaint of R hand wound.     He believes that he was bitten by a spider 4 days ago. He developed a pustule in second webbed space and tried to pop it yesterday. He then noticed increased swelling and pain in his hand and noticed it starting to move up his arm to his axilla, so he presented to ER. IV clindamycin started yesterday, which has significantly improved proximal extension of redness and swelling.  Hand swelling has improved some as well.  Orthopedics consulted for surgical eval.      He also endorses bilateral non-healing blisters on his toes currently being treated by podiatry.    Past Medical History:   Diagnosis Date    Hepatitis C        Past Surgical History:   Procedure Laterality Date    INGUINAL HERNIA REPAIR      TONSILLECTOMY         Review of patient's allergies indicates:  No Known Allergies    Current Facility-Administered Medications   Medication    clindamycin 600 MG/50 ML D5W 600 mg/50 mL IVPB 600 mg    nicotine 14 mg/24 hr 1 patch    oxyCODONE-acetaminophen 5-325 mg per tablet 1 tablet    [START ON 10/30/2017] silver sulfADIAZINE 1% cream     Family History     None        Social " "History Main Topics    Smoking status: Current Some Day Smoker     Packs/day: 1.00     Years: 40.00     Types: Cigarettes    Smokeless tobacco: Never Used    Alcohol use 2.4 oz/week     4 Cans of beer per week    Drug use:      Types: Marijuana    Sexual activity: Not on file     ROS   Constitutional: negative for fevers  Eyes: no visual changes  ENT: negative for hearing loss  Respiratory: negative for dyspnea  Cardiovascular: negative for chest pain  Gastrointestinal: negative for abdominal pain  Genitourinary: negative for dysuria  Neurological: negative for headaches  Behavioral/Psych: negative for hallucinations  Endocrine: negative for temperature intolerance    Objective:     Vital Signs (Most Recent):  Temp: 97 °F (36.1 °C) (10/29/17 1504)  Pulse: 60 (10/29/17 1504)  Resp: 18 (10/29/17 1504)  BP: 133/77 (10/29/17 1504)  SpO2: 100 % (10/29/17 1504) Vital Signs (24h Range):  Temp:  [97 °F (36.1 °C)-98.8 °F (37.1 °C)] 97 °F (36.1 °C)  Pulse:  [51-76] 60  Resp:  [15-18] 18  SpO2:  [95 %-100 %] 100 %  BP: (110-133)/(66-82) 133/77     Weight: 62.6 kg (138 lb)  Height: 5' 10" (177.8 cm)  Body mass index is 19.8 kg/m².      Intake/Output Summary (Last 24 hours) at 10/29/17 1903  Last data filed at 10/29/17 1831   Gross per 24 hour   Intake             1730 ml   Output                0 ml   Net             1730 ml       Ortho/SPM Exam  MSK:  RUE:   Wound to second webbed space with fibrinous exudate surrounded by macerated skin, erythema, swelling  No fluctuance  No proximal tracking noted  No volar TTP  No pain with finger extension  No fusiform swelling  Able to move fingers without significant pain  Brisk cap refill  Warm well perfused extremities    Significant Labs:   CBC:   Recent Labs  Lab 10/28/17  1302   WBC 9.02   HGB 13.8*   HCT 39.5*        CRP:   Recent Labs  Lab 10/29/17  1113   CRP 11.3*     Lactic Acid:   Recent Labs  Lab 10/29/17  1113   LACTATE 0.8       Significant Imaging: I have " reviewed all pertinent imaging results/findings.    Assessment/Plan:     Cellulitis of right hand    52 y.o. male with R hand webspace wound    Pain control  DVT PPx: per primary  Abx: IV clindamycin  Drain: none  Rowe: none    No concern for flexor tenosynovitis.  No fluctuance suggesting drainable abscess.  Recommend continued IV abx and wound care for macerated wound.  Will reevaluate in AM.  NPO at midnight as precaution.              Thank you for your consult.     Leo Solo MD  Orthopedics  [facility]

## 2017-10-30 NOTE — SUBJECTIVE & OBJECTIVE
Interval Hx: Reports pain improved to B/L feet. Bandages intact.     Scheduled Meds:   cadexomer iodine   Topical Daily    clindamycin (CLEOCIN) IVPB  600 mg Intravenous Q8H    nicotine  1 patch Transdermal Daily    silver sulfADIAZINE 1%   Topical (Top) Daily     Continuous Infusions:   PRN Meds:oxyCODONE-acetaminophen    Review of patient's allergies indicates:  No Known Allergies     Past Medical History:   Diagnosis Date    Hepatitis C      Past Surgical History:   Procedure Laterality Date    INGUINAL HERNIA REPAIR      TONSILLECTOMY         Family History     None        Social History Main Topics    Smoking status: Current Some Day Smoker     Packs/day: 1.00     Years: 40.00     Types: Cigarettes    Smokeless tobacco: Never Used    Alcohol use 2.4 oz/week     4 Cans of beer per week    Drug use:      Types: Marijuana    Sexual activity: Not on file     Review of Systems   Constitutional: Negative for chills and fever.   Gastrointestinal: Negative for nausea and vomiting.   Skin:        blisters     Objective:     Vital Signs (Most Recent):  Temp: 97.7 °F (36.5 °C) (10/30/17 1054)  Pulse: 60 (10/30/17 1054)  Resp: 18 (10/30/17 1054)  BP: (!) 166/92 (10/30/17 1054)  SpO2: 97 % (10/30/17 1054) Vital Signs (24h Range):  Temp:  [95.8 °F (35.4 °C)-97.7 °F (36.5 °C)] 97.7 °F (36.5 °C)  Pulse:  [50-60] 60  Resp:  [14-18] 18  SpO2:  [96 %-100 %] 97 %  BP: (118-166)/(72-92) 166/92     Weight: 62.6 kg (138 lb)  Body mass index is 19.8 kg/m².    Foot Exam    General  General Appearance: appears stated age and healthy   Orientation: alert and oriented to person, place, and time   Affect: appropriate       Right Foot/Ankle     Inspection and Palpation  Ecchymosis: none  Tenderness: none   Swelling: none   Arch: normal  Hammertoes: absent  Claw Toes: absent  Hallux valgus: no  Hallux limitus: no    Neurovascular  Dorsalis pedis: 2+  Posterior tibial: 2+  Saphenous nerve sensation: normal  Tibial nerve sensation:  normal  Superficial peroneal nerve sensation: normal  Deep peroneal nerve sensation: normal  Sural nerve sensation: normal      Left Foot/Ankle      Inspection and Palpation  Ecchymosis: none  Tenderness: none   Swelling: none   Arch: normal  Hammertoes: absent  Claw toes: absent  Hallux valgus: no  Hallux limitus: no    Neurovascular  Dorsalis pedis: 2+  Posterior tibial: 2+  Saphenous nerve sensation: normal  Tibial nerve sensation: normal  Superficial peroneal nerve sensation: normal  Deep peroneal nerve sensation: normal  Sural nerve sensation: normal        Blister on interspaces of right 3rd and 4th toe, and left 1st, 2nd, and 3rd digits.  Blisters deroofed, they do not probe, mild surrounding erythema, no purulent discharge.    10/30/17 L foot       10/30/17 Right foot               Laboratory:  CBC:     Recent Labs  Lab 10/28/17  1302   WBC 9.02   RBC 4.30*   HGB 13.8*   HCT 39.5*      MCV 92   MCH 32.1*   MCHC 34.9     CMP:     Recent Labs  Lab 10/28/17  1302   GLU 94   CALCIUM 9.4   ALBUMIN 3.6   PROT 8.6*      K 3.7   CO2 26   CL 99   BUN 15   CREATININE 0.8   ALKPHOS 82   ALT 52*   AST 55*   BILITOT 0.8     CRP:     Recent Labs  Lab 10/29/17  1113   CRP 11.3*     ESR:     Recent Labs  Lab 10/29/17  1113   SEDRATE 25*       Diagnostic Results:  I have reviewed all pertinent imaging results/findings within the past 24 hours.    Clinical Findings:  Ulceration  on interspaces of right 3rd and 4th toe, and left 1st, 2nd, and 3rd digits.

## 2017-10-30 NOTE — PROGRESS NOTES
[facility]  Podiatry  Progress Note    Patient Name: Red Darden  MRN: [id number]  Admission Date: 10/28/2017  Hospital Length of Stay: 2 days  Attending Physician: Holden Jones MD  Primary Care Provider: Primary Doctor No   Interval Hx: Reports pain improved to B/L feet. Bandages intact.     Scheduled Meds:   cadexomer iodine   Topical Daily    clindamycin (CLEOCIN) IVPB  600 mg Intravenous Q8H    nicotine  1 patch Transdermal Daily    silver sulfADIAZINE 1%   Topical (Top) Daily     Continuous Infusions:   PRN Meds:oxyCODONE-acetaminophen    Review of patient's allergies indicates:  No Known Allergies     Past Medical History:   Diagnosis Date    Hepatitis C      Past Surgical History:   Procedure Laterality Date    INGUINAL HERNIA REPAIR      TONSILLECTOMY         Family History     None        Social History Main Topics    Smoking status: Current Some Day Smoker     Packs/day: 1.00     Years: 40.00     Types: Cigarettes    Smokeless tobacco: Never Used    Alcohol use 2.4 oz/week     4 Cans of beer per week    Drug use:      Types: Marijuana    Sexual activity: Not on file     Review of Systems   Constitutional: Negative for chills and fever.   Gastrointestinal: Negative for nausea and vomiting.   Skin:        blisters     Objective:     Vital Signs (Most Recent):  Temp: 97.7 °F (36.5 °C) (10/30/17 1054)  Pulse: 60 (10/30/17 1054)  Resp: 18 (10/30/17 1054)  BP: (!) 166/92 (10/30/17 1054)  SpO2: 97 % (10/30/17 1054) Vital Signs (24h Range):  Temp:  [95.8 °F (35.4 °C)-97.7 °F (36.5 °C)] 97.7 °F (36.5 °C)  Pulse:  [50-60] 60  Resp:  [14-18] 18  SpO2:  [96 %-100 %] 97 %  BP: (118-166)/(72-92) 166/92     Weight: 62.6 kg (138 lb)  Body mass index is 19.8 kg/m².    Foot Exam    General  General Appearance: appears stated age and healthy   Orientation: alert and oriented to person, place, and time   Affect: appropriate       Right Foot/Ankle     Inspection and Palpation  Ecchymosis:  none  Tenderness: none   Swelling: none   Arch: normal  Hammertoes: absent  Claw Toes: absent  Hallux valgus: no  Hallux limitus: no    Neurovascular  Dorsalis pedis: 2+  Posterior tibial: 2+  Saphenous nerve sensation: normal  Tibial nerve sensation: normal  Superficial peroneal nerve sensation: normal  Deep peroneal nerve sensation: normal  Sural nerve sensation: normal      Left Foot/Ankle      Inspection and Palpation  Ecchymosis: none  Tenderness: none   Swelling: none   Arch: normal  Hammertoes: absent  Claw toes: absent  Hallux valgus: no  Hallux limitus: no    Neurovascular  Dorsalis pedis: 2+  Posterior tibial: 2+  Saphenous nerve sensation: normal  Tibial nerve sensation: normal  Superficial peroneal nerve sensation: normal  Deep peroneal nerve sensation: normal  Sural nerve sensation: normal        Ulceration to interspaces of right 3rd and 4th toe, and left 1st, 2nd, and 3rd digits. do not probe, mild surrounding erythema, no purulent discharge.    10/30/17 L foot       10/30/17 Right foot               Laboratory:  CBC:     Recent Labs  Lab 10/28/17  1302   WBC 9.02   RBC 4.30*   HGB 13.8*   HCT 39.5*      MCV 92   MCH 32.1*   MCHC 34.9     CMP:     Recent Labs  Lab 10/28/17  1302   GLU 94   CALCIUM 9.4   ALBUMIN 3.6   PROT 8.6*      K 3.7   CO2 26   CL 99   BUN 15   CREATININE 0.8   ALKPHOS 82   ALT 52*   AST 55*   BILITOT 0.8     CRP:     Recent Labs  Lab 10/29/17  1113   CRP 11.3*     ESR:     Recent Labs  Lab 10/29/17  1113   SEDRATE 25*       Diagnostic Results:  I have reviewed all pertinent imaging results/findings within the past 24 hours.    Clinical Findings:  Ulceration  on interspaces of right 3rd and 4th toe, and left 1st, 2nd, and 3rd digits.     Assessment/Plan:     Marie Darden is a 52 y.o. male with ulceration on interspaces of right 3rd and 4th toe, and left 1st, 2nd, and 3rd digits.    -Dressed with betadine, mepilex foam to interdigital spaces 4x4's, kerlix  and ace  -Nursing to do daily dressing changes with betadine, 4x4's, kerlix, and ace.  Place Darco shoes  -Podiatry will follow    Upon DC patient is to follow up in Podiatry clinic within one week.  Weight bearing as tolerated in Darco shoes.    Iliana Chavez DPM PGY-3  Pager: [phone]          Hepatitis C    Per primary            Iliana Chavez MD  Podiatry  [facility]

## 2017-10-30 NOTE — ASSESSMENT & PLAN NOTE
52 y.o. male with R hand webspace wound    Pain control  DVT PPx: per primary  Abx: IV clindamycin  Drain: none  Rowe: none    No concern for flexor tenosynovitis.  No fluctuance suggesting drainable abscess.  Recommend continued IV abx and wound care for macerated wound.  Will reevaluate in AM.

## 2017-10-30 NOTE — HPI
The patient is a 52 y.o. male with h/o Hep C that presents to the ED with a complaint of R hand wound.     He believes that he was bitten by a spider 4 days ago. He developed a pustule in second webbed space and tried to pop it yesterday. He then noticed increased swelling and pain in his hand and noticed it starting to move up his arm to his axilla, so he presented to ER. IV clindamycin started yesterday, which has significantly improved proximal extension of redness and swelling.  Hand swelling has improved some as well.  Orthopedics consulted for surgical eval.      He also endorses bilateral non-healing blisters on his toes currently being treated by podiatry.

## 2017-10-30 NOTE — ASSESSMENT & PLAN NOTE
Red Darden is a 52 y.o. male with ulceration on interspaces of right 3rd and 4th toe, and left 1st, 2nd, and 3rd digits.    -Dressed with betadine, mepilex foam to interdigital spaces 4x4's, kerlix and ace  -Nursing to do daily dressing changes with betadine, 4x4's, kerlix, and ace.  Place Darco shoes  -Podiatry will follow    Upon DC patient is to follow up in Podiatry clinic within one week.  Weight bearing as tolerated in Darco shoes.    Iliana Chavez DPM PGY-3  Pager: [phone]

## 2017-11-01 NOTE — DISCHARGE SUMMARY
"[facility] Hospital Medicine  Discharge Summary      Patient Name: Red Darden  MRN: [id number]  Admission Date: 10/28/2017  Hospital Length of Stay: 2 days  Discharge Date and Time: 10/30/2017  3:17 PM  Attending Physician: Sonia att. providers found   Discharging Provider: Javy Jones MD  Primary Care Provider: Primary Doctor No  [facility] Medicine Team: [facility] MED B Javy Jones MD    HPI:   No notes on file    * No surgery found *      Hospital Course:   The patient is a 52 y.o. male with no pertinent medical history that presents to the ED with a complaint of swelling to the right hand and arm. He believes that he was bitten by a spider a few days ago. He tried to tried to treat it at home by attempting to opening it and putting peroxide and "another cream" on it. He then noticed increased swelling and pain in his hand and noticed it starting to move up his arm. The swelling currently extends to his axilla. He took Advil PTA. He also endorses bilateral non-healing blisters on his toes.     He endorses hepatitis C for the last 33 years, and taking medication for a while, but stopping because he could not afford the medication. 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 He denies other substance use, numbness in his fingers, medical problems, DM, and home medications. He has not seen a doctor in the last year.      Will provide po meds on discharge.    Seen by ortho No surgery needed.     Consults:   Consults         Status Ordering Provider     Inpatient consult to Orthopedic Surgery  Once     Provider:  (Not yet assigned)    JAVY Castillo     Inpatient consult to Podiatry  Once     Provider:  (Not yet assigned)    JAVY Castillo          * Cellulitis of right hand    IV vancomycin   ortho consult            Final Active Diagnoses:    Diagnosis Date Noted " POA    PRINCIPAL PROBLEM:  Cellulitis of right hand [L03.113] 10/28/2017 Yes    Bullae [R23.8] 10/29/2017 Unknown    Hepatitis C [B19.20] 10/28/2017 Unknown      Problems Resolved During this Admission:    Diagnosis Date Noted Date Resolved POA       Discharged Condition: good    Disposition: Home or Self Care    Follow Up:    Patient Instructions:     Ambulatory Referral to Pharmacy Assistance   Referral Priority: Routine Referral Type: Consultation   Referral Reason: Specialty Services Required    Number of Visits Requested: 1          Significant Diagnostic Studies: Labs: BMP: No results for input(s): GLU, NA, K, CL, CO2, BUN, CREATININE, CALCIUM, MG in the last 48 hours.    Pending Diagnostic Studies:     None         Medications:  Reconciled Home Medications:   Discharge Medication List as of 10/30/2017 11:49 AM      START taking these medications    Details   clindamycin (CLEOCIN) 300 MG capsule Take 1 capsule (300 mg total) by mouth every 8 (eight) hours., Starting Mon 10/30/2017, Until Mon 11/6/2017, Normal      oxyCODONE-acetaminophen (PERCOCET) 5-325 mg per tablet Take 1 tablet by mouth every 4 (four) hours as needed., Starting Mon 10/30/2017, Normal             Indwelling Lines/Drains at time of discharge:   Lines/Drains/Airways          No matching active lines, drains, or airways          Time spent on the discharge of patient: 30 minutes  Patient was seen and examined on the date of discharge and determined to be suitable for discharge.         Holden Jones MD  Department of Hospital Medicine  [facility]

## 2017-11-07 PROBLEM — R23.8 BULLAE: Status: ACTIVE | Noted: 2017-11-07

## 2017-11-07 PROBLEM — B19.20 HEPATITIS C: Status: ACTIVE | Noted: 2017-11-07
